# Patient Record
Sex: FEMALE | Race: WHITE | NOT HISPANIC OR LATINO | Employment: OTHER | URBAN - METROPOLITAN AREA
[De-identification: names, ages, dates, MRNs, and addresses within clinical notes are randomized per-mention and may not be internally consistent; named-entity substitution may affect disease eponyms.]

---

## 2018-02-14 ENCOUNTER — RECORDS - HEALTHEAST (OUTPATIENT)
Dept: LAB | Facility: CLINIC | Age: 58
End: 2018-02-14

## 2018-02-14 LAB
ALBUMIN SERPL-MCNC: 4.1 G/DL (ref 3.5–5)
ALP SERPL-CCNC: 126 U/L (ref 45–120)
ALT SERPL W P-5'-P-CCNC: 134 U/L (ref 0–45)
ANION GAP SERPL CALCULATED.3IONS-SCNC: 16 MMOL/L (ref 5–18)
AST SERPL W P-5'-P-CCNC: 81 U/L (ref 0–40)
BILIRUB SERPL-MCNC: 1.6 MG/DL (ref 0–1)
BUN SERPL-MCNC: 9 MG/DL (ref 8–22)
CALCIUM SERPL-MCNC: 9.4 MG/DL (ref 8.5–10.5)
CHLORIDE BLD-SCNC: 104 MMOL/L (ref 98–107)
CO2 SERPL-SCNC: 21 MMOL/L (ref 22–31)
CREAT SERPL-MCNC: 0.78 MG/DL (ref 0.6–1.1)
GFR SERPL CREATININE-BSD FRML MDRD: >60 ML/MIN/1.73M2
GLUCOSE BLD-MCNC: 102 MG/DL (ref 70–125)
POTASSIUM BLD-SCNC: 3.8 MMOL/L (ref 3.5–5)
PROT SERPL-MCNC: 8 G/DL (ref 6–8)
SODIUM SERPL-SCNC: 141 MMOL/L (ref 136–145)

## 2018-02-19 ENCOUNTER — RECORDS - HEALTHEAST (OUTPATIENT)
Dept: LAB | Facility: CLINIC | Age: 58
End: 2018-02-19

## 2018-02-21 LAB
HAV IGM SERPL QL IA: NEGATIVE
HBV CORE IGM SERPL QL IA: NEGATIVE
HBV SURFACE AG SERPL QL IA: NEGATIVE
HCV AB SERPL QL IA: NEGATIVE

## 2018-04-09 ENCOUNTER — RECORDS - HEALTHEAST (OUTPATIENT)
Dept: LAB | Facility: CLINIC | Age: 58
End: 2018-04-09

## 2018-04-10 LAB
ALBUMIN SERPL-MCNC: 3.9 G/DL (ref 3.5–5)
ALP SERPL-CCNC: 126 U/L (ref 45–120)
ALT SERPL W P-5'-P-CCNC: 28 U/L (ref 0–45)
AST SERPL W P-5'-P-CCNC: 21 U/L (ref 0–40)
BILIRUB DIRECT SERPL-MCNC: 0.2 MG/DL
BILIRUB SERPL-MCNC: 0.8 MG/DL (ref 0–1)
CHOLEST SERPL-MCNC: 234 MG/DL
FASTING STATUS PATIENT QL REPORTED: ABNORMAL
HDLC SERPL-MCNC: 65 MG/DL
LDLC SERPL CALC-MCNC: 140 MG/DL
PROT SERPL-MCNC: 7.7 G/DL (ref 6–8)
TRIGL SERPL-MCNC: 144 MG/DL

## 2018-12-18 ENCOUNTER — RECORDS - HEALTHEAST (OUTPATIENT)
Dept: LAB | Facility: CLINIC | Age: 58
End: 2018-12-18

## 2018-12-18 LAB
ALBUMIN SERPL-MCNC: 4.3 G/DL (ref 3.5–5)
ALP SERPL-CCNC: 119 U/L (ref 45–120)
ALT SERPL W P-5'-P-CCNC: 30 U/L (ref 0–45)
ANION GAP SERPL CALCULATED.3IONS-SCNC: 15 MMOL/L (ref 5–18)
AST SERPL W P-5'-P-CCNC: 24 U/L (ref 0–40)
BILIRUB SERPL-MCNC: 1.1 MG/DL (ref 0–1)
BUN SERPL-MCNC: 11 MG/DL (ref 8–22)
CALCIUM SERPL-MCNC: 10.2 MG/DL (ref 8.5–10.5)
CHLORIDE BLD-SCNC: 103 MMOL/L (ref 98–107)
CO2 SERPL-SCNC: 22 MMOL/L (ref 22–31)
CREAT SERPL-MCNC: 0.8 MG/DL (ref 0.6–1.1)
GFR SERPL CREATININE-BSD FRML MDRD: >60 ML/MIN/1.73M2
GLUCOSE BLD-MCNC: 101 MG/DL (ref 70–125)
POTASSIUM BLD-SCNC: 4.2 MMOL/L (ref 3.5–5)
PROT SERPL-MCNC: 7.7 G/DL (ref 6–8)
SODIUM SERPL-SCNC: 140 MMOL/L (ref 136–145)
TSH SERPL DL<=0.005 MIU/L-ACNC: 1.24 UIU/ML (ref 0.3–5)
VIT B12 SERPL-MCNC: 634 PG/ML (ref 213–816)

## 2018-12-19 LAB — 25(OH)D3 SERPL-MCNC: 20.5 NG/ML (ref 30–80)

## 2019-01-11 ENCOUNTER — RECORDS - HEALTHEAST (OUTPATIENT)
Dept: ADMINISTRATIVE | Facility: OTHER | Age: 59
End: 2019-01-11

## 2019-01-11 ENCOUNTER — AMBULATORY - HEALTHEAST (OUTPATIENT)
Dept: CARDIOLOGY | Facility: CLINIC | Age: 59
End: 2019-01-11

## 2019-01-18 ENCOUNTER — OFFICE VISIT - HEALTHEAST (OUTPATIENT)
Dept: CARDIOLOGY | Facility: CLINIC | Age: 59
End: 2019-01-18

## 2019-01-18 DIAGNOSIS — R06.09 DOE (DYSPNEA ON EXERTION): ICD-10-CM

## 2019-01-18 DIAGNOSIS — R00.0 SINUS TACHYCARDIA: ICD-10-CM

## 2019-01-18 RX ORDER — IBUPROFEN 200 MG
200-400 TABLET ORAL EVERY 6 HOURS PRN
Status: SHIPPED | COMMUNITY
Start: 2019-01-18

## 2019-01-18 RX ORDER — ACETAMINOPHEN 500 MG
500-1000 TABLET ORAL EVERY 6 HOURS PRN
Status: SHIPPED | COMMUNITY
Start: 2019-01-18

## 2019-01-18 RX ORDER — OMEGA-3S/DHA/EPA/FISH OIL/D3 300MG-1000
400 CAPSULE ORAL DAILY
Status: SHIPPED | COMMUNITY
Start: 2019-01-18

## 2019-01-18 RX ORDER — PAROXETINE HYDROCHLORIDE HEMIHYDRATE 25 MG/1
1 TABLET, FILM COATED, EXTENDED RELEASE ORAL DAILY
Refills: 2 | Status: SHIPPED | COMMUNITY
Start: 2018-09-04

## 2019-01-18 RX ORDER — ZOLPIDEM TARTRATE 10 MG/1
0.5 TABLET ORAL
Refills: 2 | Status: SHIPPED | COMMUNITY
Start: 2018-10-11

## 2019-01-18 RX ORDER — OMEPRAZOLE 40 MG/1
1 CAPSULE, DELAYED RELEASE ORAL PRN
Refills: 3 | Status: SHIPPED | COMMUNITY
Start: 2018-05-03

## 2019-01-18 ASSESSMENT — MIFFLIN-ST. JEOR: SCORE: 1675.85

## 2019-01-31 ENCOUNTER — HOSPITAL ENCOUNTER (OUTPATIENT)
Dept: CARDIOLOGY | Facility: CLINIC | Age: 59
Discharge: HOME | End: 2019-01-31
Attending: INTERNAL MEDICINE

## 2019-01-31 DIAGNOSIS — R00.0 SINUS TACHYCARDIA: ICD-10-CM

## 2019-01-31 DIAGNOSIS — R06.09 DOE (DYSPNEA ON EXERTION): ICD-10-CM

## 2019-01-31 DIAGNOSIS — R06.09 OTHER FORMS OF DYSPNEA: ICD-10-CM

## 2019-01-31 LAB
CV STRESS CURRENT BP HE: NORMAL
CV STRESS CURRENT HR HE: 118
CV STRESS CURRENT HR HE: 119
CV STRESS CURRENT HR HE: 119
CV STRESS CURRENT HR HE: 125
CV STRESS CURRENT HR HE: 127
CV STRESS CURRENT HR HE: 131
CV STRESS CURRENT HR HE: 134
CV STRESS CURRENT HR HE: 138
CV STRESS CURRENT HR HE: 140
CV STRESS CURRENT HR HE: 141
CV STRESS CURRENT HR HE: 147
CV STRESS CURRENT HR HE: 149
CV STRESS CURRENT HR HE: 151
CV STRESS CURRENT HR HE: 154
CV STRESS CURRENT HR HE: 160
CV STRESS CURRENT HR HE: 164
CV STRESS CURRENT HR HE: 165
CV STRESS CURRENT HR HE: 165
CV STRESS CURRENT HR HE: 179
CV STRESS CURRENT HR HE: 185
CV STRESS DEVIATION TIME HE: NORMAL
CV STRESS ECHO PERCENT HR HE: NORMAL
CV STRESS EXERCISE STAGE HE: NORMAL
CV STRESS FINAL RESTING BP HE: NORMAL
CV STRESS FINAL RESTING HR HE: 119
CV STRESS MAX HR HE: 186
CV STRESS MAX TREADMILL GRADE HE: 14
CV STRESS MAX TREADMILL SPEED HE: 3.4
CV STRESS PEAK DIA BP HE: NORMAL
CV STRESS PEAK SYS BP HE: NORMAL
CV STRESS PHASE HE: NORMAL
CV STRESS PROTOCOL HE: NORMAL
CV STRESS RESTING PT POSITION HE: NORMAL
CV STRESS RESTING PT POSITION HE: NORMAL
CV STRESS ST DEVIATION AMOUNT HE: NORMAL
CV STRESS ST DEVIATION ELEVATION HE: NORMAL
CV STRESS ST EVELATION AMOUNT HE: NORMAL
CV STRESS TEST TYPE HE: NORMAL
CV STRESS TOTAL STAGE TIME MIN 1 HE: NORMAL
STRESS ECHO BASELINE BP: NORMAL
STRESS ECHO BASELINE HR: 126
STRESS ECHO CALCULATED PERCENT HR: 114 %
STRESS ECHO LAST STRESS BP: NORMAL
STRESS ECHO LAST STRESS HR: 185
STRESS ECHO POST ESTIMATED WORKLOAD: 7.3
STRESS ECHO POST EXERCISE DUR MIN: 6
STRESS ECHO POST EXERCISE DUR SEC: 0
STRESS ECHO TARGET HR: 139

## 2019-09-24 ENCOUNTER — RECORDS - HEALTHEAST (OUTPATIENT)
Dept: LAB | Facility: CLINIC | Age: 59
End: 2019-09-24

## 2019-09-24 LAB
CHOLEST SERPL-MCNC: 253 MG/DL
FASTING STATUS PATIENT QL REPORTED: YES
HDLC SERPL-MCNC: 62 MG/DL
LDLC SERPL CALC-MCNC: 147 MG/DL
TRIGL SERPL-MCNC: 219 MG/DL

## 2019-09-25 LAB — MEV IGG SER IA-ACNC: POSITIVE

## 2019-12-11 ENCOUNTER — RECORDS - HEALTHEAST (OUTPATIENT)
Dept: LAB | Facility: CLINIC | Age: 59
End: 2019-12-11

## 2019-12-12 LAB — BACTERIA SPEC CULT: NO GROWTH

## 2020-01-14 ENCOUNTER — RECORDS - HEALTHEAST (OUTPATIENT)
Dept: LAB | Facility: CLINIC | Age: 60
End: 2020-01-14

## 2020-01-14 LAB
ALBUMIN SERPL-MCNC: 4.5 G/DL (ref 3.5–5)
ALP SERPL-CCNC: 123 U/L (ref 45–120)
ALT SERPL W P-5'-P-CCNC: 61 U/L (ref 0–45)
AMYLASE SERPL-CCNC: 34 U/L (ref 5–120)
ANION GAP SERPL CALCULATED.3IONS-SCNC: 15 MMOL/L (ref 5–18)
AST SERPL W P-5'-P-CCNC: 56 U/L (ref 0–40)
BILIRUB SERPL-MCNC: 1.8 MG/DL (ref 0–1)
BUN SERPL-MCNC: 14 MG/DL (ref 8–22)
CALCIUM SERPL-MCNC: 9.9 MG/DL (ref 8.5–10.5)
CHLORIDE BLD-SCNC: 102 MMOL/L (ref 98–107)
CO2 SERPL-SCNC: 21 MMOL/L (ref 22–31)
CREAT SERPL-MCNC: 0.94 MG/DL (ref 0.6–1.1)
GFR SERPL CREATININE-BSD FRML MDRD: >60 ML/MIN/1.73M2
GLUCOSE BLD-MCNC: 98 MG/DL (ref 70–125)
LIPASE SERPL-CCNC: 29 U/L (ref 0–52)
POTASSIUM BLD-SCNC: 3.9 MMOL/L (ref 3.5–5)
PROT SERPL-MCNC: 7.9 G/DL (ref 6–8)
SODIUM SERPL-SCNC: 138 MMOL/L (ref 136–145)

## 2020-01-15 ENCOUNTER — RECORDS - HEALTHEAST (OUTPATIENT)
Dept: LAB | Facility: CLINIC | Age: 60
End: 2020-01-15

## 2020-01-17 LAB — H PYLORI AG STL QL IA: NEGATIVE

## 2020-01-20 ENCOUNTER — RECORDS - HEALTHEAST (OUTPATIENT)
Dept: ADMINISTRATIVE | Facility: OTHER | Age: 60
End: 2020-01-20

## 2020-01-21 ENCOUNTER — HOSPITAL ENCOUNTER (OUTPATIENT)
Dept: ULTRASOUND IMAGING | Facility: CLINIC | Age: 60
Discharge: HOME | End: 2020-01-21
Attending: PHYSICIAN ASSISTANT

## 2020-01-21 DIAGNOSIS — R10.9 ABDOMINAL PAIN: ICD-10-CM

## 2020-02-06 ENCOUNTER — RECORDS - HEALTHEAST (OUTPATIENT)
Dept: ADMINISTRATIVE | Facility: OTHER | Age: 60
End: 2020-02-06

## 2020-02-06 LAB
LAB AP CHARGES (HE HISTORICAL CONVERSION): NORMAL
PATH REPORT.COMMENTS IMP SPEC: NORMAL
PATH REPORT.FINAL DX SPEC: NORMAL
PATH REPORT.GROSS SPEC: NORMAL
PATH REPORT.MICROSCOPIC SPEC OTHER STN: NORMAL
PATH REPORT.RELEVANT HX SPEC: NORMAL
RESULT FLAG (HE HISTORICAL CONVERSION): NORMAL

## 2020-08-05 ENCOUNTER — RECORDS - HEALTHEAST (OUTPATIENT)
Dept: LAB | Facility: CLINIC | Age: 60
End: 2020-08-05

## 2020-08-08 LAB
BACTERIA SPEC CULT: ABNORMAL
BACTERIA SPEC CULT: ABNORMAL

## 2020-08-17 ENCOUNTER — RECORDS - HEALTHEAST (OUTPATIENT)
Dept: LAB | Facility: CLINIC | Age: 60
End: 2020-08-17

## 2020-08-18 LAB — BACTERIA SPEC CULT: NO GROWTH

## 2020-08-20 ENCOUNTER — RECORDS - HEALTHEAST (OUTPATIENT)
Dept: LAB | Facility: CLINIC | Age: 60
End: 2020-08-20

## 2020-08-20 LAB
ALBUMIN SERPL-MCNC: 4.2 G/DL (ref 3.5–5)
ALP SERPL-CCNC: 124 U/L (ref 45–120)
ALT SERPL W P-5'-P-CCNC: 18 U/L (ref 0–45)
ANION GAP SERPL CALCULATED.3IONS-SCNC: 15 MMOL/L (ref 5–18)
AST SERPL W P-5'-P-CCNC: 17 U/L (ref 0–40)
BILIRUB SERPL-MCNC: 1.1 MG/DL (ref 0–1)
BUN SERPL-MCNC: 12 MG/DL (ref 8–22)
CALCIUM SERPL-MCNC: 9.3 MG/DL (ref 8.5–10.5)
CHLORIDE BLD-SCNC: 104 MMOL/L (ref 98–107)
CO2 SERPL-SCNC: 21 MMOL/L (ref 22–31)
CREAT SERPL-MCNC: 0.85 MG/DL (ref 0.6–1.1)
GFR SERPL CREATININE-BSD FRML MDRD: >60 ML/MIN/1.73M2
GLUCOSE BLD-MCNC: 95 MG/DL (ref 70–125)
LIPASE SERPL-CCNC: 31 U/L (ref 0–52)
POTASSIUM BLD-SCNC: 3.8 MMOL/L (ref 3.5–5)
PROT SERPL-MCNC: 7.5 G/DL (ref 6–8)
SODIUM SERPL-SCNC: 140 MMOL/L (ref 136–145)

## 2020-10-29 ENCOUNTER — RECORDS - HEALTHEAST (OUTPATIENT)
Dept: LAB | Facility: CLINIC | Age: 60
End: 2020-10-29

## 2020-10-31 LAB — BACTERIA SPEC CULT: ABNORMAL

## 2021-03-25 ENCOUNTER — RECORDS - HEALTHEAST (OUTPATIENT)
Dept: LAB | Facility: CLINIC | Age: 61
End: 2021-03-25

## 2021-03-27 LAB — BACTERIA SPEC CULT: ABNORMAL

## 2021-05-25 ENCOUNTER — RECORDS - HEALTHEAST (OUTPATIENT)
Dept: ADMINISTRATIVE | Facility: CLINIC | Age: 61
End: 2021-05-25

## 2021-06-02 VITALS — HEIGHT: 66 IN | BODY MASS INDEX: 38.41 KG/M2 | WEIGHT: 239 LBS

## 2021-06-23 NOTE — PROGRESS NOTES
[facility] Consultation Note    Thank you, Marian Ramos PA-C, for asking the [facility] team to see Jaja Hull in consultation today at our clinic to evaluate dyspnea on exertion and sinus tachycardia.      Assessment:   1.  Dyspnea on exertion likely due to deconditioning and obesity doubt any significant underlying cardiac pathology  2.  Sinus tachycardia is a variation of normal     Plan:   We will obtain a stress echo in the near future to evaluate underlying left ventricular systolic function and to exclude significant underlying coronary artery disease.  We will have a Holter monitor placed to document normal variation in heart rate throughout the day.  If these 2 tests are unremarkable no further cardiac evaluation or follow-up is needed            Current History:   58-year-old female with no previous cardiac history.  Patient states she had a stress test in  that was normal.  She otherwise has had no other cardiac evaluation.  Patient had an EKG obtained in  when she had a sinus tachycardia on that tracing at 110 bpm.  Patient had a recent physical evaluation on 2018 where an EKG showed a sinus tachycardia at 108 bpm.  Patient does report to some mild dyspnea on exertion with exercise but no other symptoms of heart failure.  Patient has no prior regular exercise until the beginning of this year when she is attempted to begin a walking program    Past Medical History:   No past medical history on file.  Gastroesophageal reflux disease as her only medical problem as well as a history of anxiety    Past Surgical History:   No past surgical history on file.  Left bunionectomy as her only surgery    Family History:   No family history on file.  Father lived to be 92.  Mother  of a brain aneurysm at age 53.  She has 2 brothers none of whom have known coronary artery disease    Social History:    reports that  has never smoked. she has never used  "smokeless tobacco. She reports that she does not use drugs.  Patient is single and employed     Meds:   Scheduled Meds:  PRN Meds:.    Allergies:   Sulfa (sulfonamide antibiotics)    Review of Systems:   General: WNL  Eyes: WNL  Ears/Nose/Throat: WNL  Lungs: WNL  Heart: WNL  Stomach: WNL  Bladder: WNL  Muscle/Joints: WNL  Skin: WNL  Nervous System: WNL  Mental Health: Depression, Anxiety     Blood: WNL      Objective:      Physical Exam  (!) 239 lb (108.4 kg)  5' 6\" (1.676 m)  Body mass index is 38.58 kg/m .  /78 (Patient Site: Right Arm, Patient Position: Sitting, Cuff Size: Adult Large)   Pulse (!) 116   Resp 16   Ht 5' 6\" (1.676 m)   Wt (!) 239 lb (108.4 kg)   BMI 38.58 kg/m      General Appearance:   alert, no apparent distress   HEENT:  no scleral icterus; the mucous membranes were pink and moist                                  Neck: jugular venous pressure is normal, no thyromegaly   Chest: the spine was straight and the chest was symmetric   Lungs:   respirations unlabored; the lungs are clear to auscultation   Cardiovascular:   regular rhythm with normal first and second heart sounds and no murmurs, clicks, or gallops. Carotid, radial, femoral, and posterior tibial pulses are intact; there are no carotid or femoral bruits.   Abdomen:  no organomegaly, masses, bruits, or tenderness; bowel sounds are present   Extremities: no cyanosis, clubbing, or edema.  No obvious musculoskeletal abnormalities   Skin: no xanthelasma   Neurologic: mood and affect are appropriate         ECG from December 18, 2018 personally reviewed as above          Lab Review   Lab Results   Component Value Date     12/18/2018    K 4.2 12/18/2018     12/18/2018    CO2 22 12/18/2018    BUN 11 12/18/2018    CREATININE 0.80 12/18/2018    CALCIUM 10.2 12/18/2018     No results found for: WBC, HGB, HCT, MCV, PLT  Lab Results   Component Value Date    CHOL 234 (H) 04/09/2018    TRIG 144 04/09/2018    HDL 65 04/09/2018 "         Russell Johnson M.D., F.A.C.[facility]  [phone]

## 2021-07-13 ENCOUNTER — RECORDS - HEALTHEAST (OUTPATIENT)
Dept: ADMINISTRATIVE | Facility: CLINIC | Age: 61
End: 2021-07-13

## 2022-07-15 ENCOUNTER — LAB REQUISITION (OUTPATIENT)
Dept: LAB | Facility: CLINIC | Age: 62
End: 2022-07-15

## 2022-07-15 DIAGNOSIS — Z13.220 ENCOUNTER FOR SCREENING FOR LIPOID DISORDERS: ICD-10-CM

## 2022-07-15 DIAGNOSIS — Z13.1 ENCOUNTER FOR SCREENING FOR DIABETES MELLITUS: ICD-10-CM

## 2022-07-15 LAB
ALBUMIN SERPL BCG-MCNC: 4.6 G/DL (ref 3.5–5.2)
ALP SERPL-CCNC: 119 U/L (ref 35–104)
ALT SERPL W P-5'-P-CCNC: 24 U/L (ref 10–35)
ANION GAP SERPL CALCULATED.3IONS-SCNC: 15 MMOL/L (ref 7–15)
AST SERPL W P-5'-P-CCNC: 25 U/L (ref 10–35)
BILIRUB SERPL-MCNC: 0.7 MG/DL
BUN SERPL-MCNC: 14.4 MG/DL (ref 8–23)
CALCIUM SERPL-MCNC: 9.9 MG/DL (ref 8.8–10.2)
CHLORIDE SERPL-SCNC: 100 MMOL/L (ref 98–107)
CHOLEST SERPL-MCNC: 335 MG/DL
CREAT SERPL-MCNC: 0.83 MG/DL (ref 0.51–0.95)
DEPRECATED HCO3 PLAS-SCNC: 23 MMOL/L (ref 22–29)
GFR SERPL CREATININE-BSD FRML MDRD: 80 ML/MIN/1.73M2
GLUCOSE SERPL-MCNC: 97 MG/DL (ref 70–99)
HDLC SERPL-MCNC: 69 MG/DL
LDLC SERPL CALC-MCNC: 221 MG/DL
NONHDLC SERPL-MCNC: 266 MG/DL
POTASSIUM SERPL-SCNC: 4 MMOL/L (ref 3.4–5.3)
PROT SERPL-MCNC: 7.6 G/DL (ref 6.4–8.3)
SODIUM SERPL-SCNC: 138 MMOL/L (ref 136–145)
TRIGL SERPL-MCNC: 223 MG/DL

## 2022-07-15 PROCEDURE — 80053 COMPREHEN METABOLIC PANEL: CPT | Performed by: NURSE PRACTITIONER

## 2022-07-15 PROCEDURE — 80061 LIPID PANEL: CPT | Performed by: NURSE PRACTITIONER

## 2023-04-17 ENCOUNTER — LAB REQUISITION (OUTPATIENT)
Dept: LAB | Facility: CLINIC | Age: 63
End: 2023-04-17

## 2023-04-17 DIAGNOSIS — E78.2 MIXED HYPERLIPIDEMIA: ICD-10-CM

## 2023-04-17 LAB
ALBUMIN SERPL BCG-MCNC: 4.6 G/DL (ref 3.5–5.2)
ALP SERPL-CCNC: 124 U/L (ref 35–104)
ALT SERPL W P-5'-P-CCNC: 24 U/L (ref 10–35)
ANION GAP SERPL CALCULATED.3IONS-SCNC: 17 MMOL/L (ref 7–15)
AST SERPL W P-5'-P-CCNC: 21 U/L (ref 10–35)
BILIRUB SERPL-MCNC: 0.8 MG/DL
BUN SERPL-MCNC: 15 MG/DL (ref 8–23)
CALCIUM SERPL-MCNC: 9.7 MG/DL (ref 8.8–10.2)
CHLORIDE SERPL-SCNC: 102 MMOL/L (ref 98–107)
CHOLEST SERPL-MCNC: 248 MG/DL
CREAT SERPL-MCNC: 0.98 MG/DL (ref 0.51–0.95)
DEPRECATED HCO3 PLAS-SCNC: 22 MMOL/L (ref 22–29)
GFR SERPL CREATININE-BSD FRML MDRD: 65 ML/MIN/1.73M2
GLUCOSE SERPL-MCNC: 105 MG/DL (ref 70–99)
HDLC SERPL-MCNC: 66 MG/DL
LDLC SERPL CALC-MCNC: 151 MG/DL
NONHDLC SERPL-MCNC: 182 MG/DL
POTASSIUM SERPL-SCNC: 4.1 MMOL/L (ref 3.4–5.3)
PROT SERPL-MCNC: 7.3 G/DL (ref 6.4–8.3)
SODIUM SERPL-SCNC: 141 MMOL/L (ref 136–145)
TRIGL SERPL-MCNC: 153 MG/DL

## 2023-04-17 PROCEDURE — 80061 LIPID PANEL: CPT | Performed by: FAMILY MEDICINE

## 2023-04-17 PROCEDURE — 80053 COMPREHEN METABOLIC PANEL: CPT | Performed by: FAMILY MEDICINE

## 2024-07-07 ENCOUNTER — LAB REQUISITION (OUTPATIENT)
Dept: LAB | Facility: CLINIC | Age: 64
End: 2024-07-07

## 2024-07-07 DIAGNOSIS — R30.0 DYSURIA: ICD-10-CM

## 2024-07-07 PROCEDURE — 87186 SC STD MICRODIL/AGAR DIL: CPT | Performed by: FAMILY MEDICINE

## 2024-07-07 PROCEDURE — 87086 URINE CULTURE/COLONY COUNT: CPT | Performed by: FAMILY MEDICINE

## 2024-07-09 LAB — BACTERIA UR CULT: ABNORMAL

## 2024-10-28 ENCOUNTER — HOSPITAL ENCOUNTER (EMERGENCY)
Facility: HOSPITAL | Age: 64
Discharge: HOME | End: 2024-10-29
Attending: EMERGENCY MEDICINE | Admitting: EMERGENCY MEDICINE
Payer: COMMERCIAL

## 2024-10-28 DIAGNOSIS — M54.50 ACUTE BILATERAL LOW BACK PAIN WITHOUT SCIATICA: ICD-10-CM

## 2024-10-28 PROCEDURE — 99284 EMERGENCY DEPT VISIT MOD MDM: CPT

## 2024-10-28 ASSESSMENT — COLUMBIA-SUICIDE SEVERITY RATING SCALE - C-SSRS
1. IN THE PAST MONTH, HAVE YOU WISHED YOU WERE DEAD OR WISHED YOU COULD GO TO SLEEP AND NOT WAKE UP?: NO
6. HAVE YOU EVER DONE ANYTHING, STARTED TO DO ANYTHING, OR PREPARED TO DO ANYTHING TO END YOUR LIFE?: NO
2. HAVE YOU ACTUALLY HAD ANY THOUGHTS OF KILLING YOURSELF IN THE PAST MONTH?: NO

## 2024-10-29 ENCOUNTER — APPOINTMENT (OUTPATIENT)
Dept: RADIOLOGY | Facility: HOSPITAL | Age: 64
End: 2024-10-29
Attending: EMERGENCY MEDICINE
Payer: COMMERCIAL

## 2024-10-29 VITALS
BODY MASS INDEX: 39.54 KG/M2 | TEMPERATURE: 96.8 F | WEIGHT: 245 LBS | OXYGEN SATURATION: 93 % | RESPIRATION RATE: 16 BRPM | SYSTOLIC BLOOD PRESSURE: 156 MMHG | DIASTOLIC BLOOD PRESSURE: 81 MMHG | HEART RATE: 94 BPM

## 2024-10-29 PROCEDURE — 96372 THER/PROPH/DIAG INJ SC/IM: CPT | Performed by: EMERGENCY MEDICINE

## 2024-10-29 PROCEDURE — 250N000013 HC RX MED GY IP 250 OP 250 PS 637: Performed by: EMERGENCY MEDICINE

## 2024-10-29 PROCEDURE — 72100 X-RAY EXAM L-S SPINE 2/3 VWS: CPT

## 2024-10-29 PROCEDURE — 250N000011 HC RX IP 250 OP 636: Performed by: EMERGENCY MEDICINE

## 2024-10-29 RX ORDER — METHYLPREDNISOLONE 4 MG/1
TABLET ORAL
Qty: 21 TABLET | Refills: 0 | Status: SHIPPED | OUTPATIENT
Start: 2024-10-29

## 2024-10-29 RX ORDER — METHOCARBAMOL 500 MG/1
500 TABLET, FILM COATED ORAL ONCE
Status: COMPLETED | OUTPATIENT
Start: 2024-10-29 | End: 2024-10-29

## 2024-10-29 RX ORDER — KETOROLAC TROMETHAMINE 15 MG/ML
15 INJECTION, SOLUTION INTRAMUSCULAR; INTRAVENOUS ONCE
Status: COMPLETED | OUTPATIENT
Start: 2024-10-29 | End: 2024-10-29

## 2024-10-29 RX ADMIN — KETOROLAC TROMETHAMINE 15 MG: 15 INJECTION, SOLUTION INTRAMUSCULAR; INTRAVENOUS at 00:43

## 2024-10-29 RX ADMIN — METHOCARBAMOL 500 MG: 500 TABLET ORAL at 00:43

## 2024-10-29 ASSESSMENT — ACTIVITIES OF DAILY LIVING (ADL)
ADLS_ACUITY_SCORE: 0
ADLS_ACUITY_SCORE: 0

## 2024-10-29 NOTE — ED PROVIDER NOTES
EMERGENCY DEPARTMENT ENCOUNTER      NAME: Jaja Hull  AGE: 64 year old female  YOB: 1960  MRN: [id number]  EVALUATION DATE & TIME: No admission date for patient encounter.    PCP: Marian Contreras    ED PROVIDER: Eva Condon MD      Chief Complaint   Patient presents with    Fall         FINAL IMPRESSION:  1. Acute bilateral low back pain without sciatica          ED COURSE & MEDICAL DECISION MAKING:    Pertinent Labs & Imaging studies reviewed. (See chart for details)    11:39 PM I introduced myself to the patient, obtained patient history, performed a physical exam, and discussed plan for ED workup including potential diagnostic laboratory/imaging studies and interventions.    64 year old female with reported history of chronic back issues who presents to this ED for evaluation of lower back pain after a fall.  Patient reports she was whispering this evening in her kitchen when she slipped and fell onto her buttocks now complaining of low back pain.  States she did not slightly twisted her left knee with this and had some mild pain in the knee but has been able to ambulate on the left leg and states her main concern is lower back pain.  On exam she does not have any tenderness over the left knee and has full range of motion and with her able to ambulate on this area and her physical exam findings felt that knee fracture dislocation would be very unlikely and she was in agreement with avoiding x-ray of the knee at this time.  Her main complaint is pain across her lower back.  Does not have any midline thoracic or lumbar spine tenderness.  Did not strike her head or lose consciousness.  No cervical neck pain or tenderness.  She does have current ongoing issues with both her thoracic and lumbar spine and shows me prior MRIs done a couple of years ago showing degenerative disc disease and disc herniation.  She states she recently finished a steroid burst about a week ago and has an upcoming  appointment with physical therapy for issues in her thoracic spine.  On exam here she has no signs of spinal cord compression.  She is neurologically and neurovascularly intact.  She is able to ambulate.  She was given a dose of 15 mg of IM Toradol as well as 500 mg of oral Robaxin.  She does have a Robaxin prescription at home.  Had not yet taken anything for her pain prior to arrival.  Discussed obtaining x-ray of the lumbar spine to rule out vertebral fracture however overall felt this was less likely.  She does agree with this plan.  She has no other sign of injury.    She has no red flag symptoms.  X-ray does not show any compression fractures.  She does have mild anterior spondylolisthesis at L4-L5 and L5-S1 and mild posterior spondylolisthesis at L2-L3 L3-L4.  Normal vertebral body heights.  Mild to moderate disc space narrowing throughout the lower thoracic and upper lumbar spine with bulky ventral osteophyte most notably at L1-L2.  Severe facet arthropathy at L3-S1.  The visualized sacrum and pelvis are unremarkable.  Likely that she has exacerbated her chronic issues with the fall.  She was feeling improved on reevaluation and was able to ambulate to the bathroom here.  Remains without any neurologic deficit or signs or symptoms of spinal cord compression.  She would like to try a Medrol Dosepak as she did feel that the prior steroid burst was helpful.  She will use the Flexeril she already has a prescription for at home and follow-up with physical therapy later this week as she is already scheduled.  I recommended she discuss her lower back issues with them as well.  She voiced understanding.  She was given indications for return emergency department.  Advised her to follow-up with her primary care doctor.  She voiced understanding.  She was discharged home in stable condition.             At the conclusion of the encounter I discussed the results of all of the tests and the disposition. The questions  were answered. The patient or family acknowledged understanding and was agreeable with the care plan.         Medical Decision Making  Obtained supplemental history:Supplemental history obtained?: Documented in chart  Reviewed external records: External records reviewed?: Documented in chart  Care impacted by chronic illness: NA  Care significantly affected by social determinants of health:Access to Medical Care  Did you consider but not order tests?: Work up considered but not performed and documented in chart, if applicable  Did you interpret images independently?: Independent interpretation of ECG and images noted in documentation, when applicable.  Consultation discussion with other provider:Did you involve another provider (consultant, , pharmacy, etc.)?: No  Discharge. I prescribed additional prescription strength medication(s) as charted. See documentation for any additional details.    Not Applicable      MEDICATIONS GIVEN IN THE EMERGENCY:  Medications   ketorolac (TORADOL) injection 15 mg (15 mg Intramuscular $Given 10/29/24 0043)   methocarbamol (ROBAXIN) tablet 500 mg (500 mg Oral $Given 10/29/24 0043)       NEW PRESCRIPTIONS STARTED AT TODAY'S ER VISIT  Discharge Medication List as of 10/29/2024  2:06 AM        START taking these medications    Details   methylPREDNISolone (MEDROL DOSEPAK) 4 MG tablet therapy pack Follow Package Directions, Disp-21 tablet, R-0, E-Prescribe                =================================================================    HPI    Patient information was obtained from: Patient    Use of : N/A         Jaja Hull is a 64 year old female with reported history of chronic back issues who presents to this ED for evaluation of lower back pain after a fall.  Patient reports that she was referring her kitchen floor this evening when she slipped and fell onto her buttocks.  She states she twisted her left knee somewhat when she fell and was having some mild left  knee pain but that she has been able to walk on her left leg and does not have significant pain and is mostly concerned about pain in her lower back after this incident.  She states it is across her lower back on both sides.  Denies any radiation into her legs.  Denies any numbness, tingling, weakness, saddle anesthesia, or bowel or bladder incontinence.  States she did not strike her head or lose consciousness.  Denies any other injury or other areas of pain.  No chest pain or shortness of breath.  No abdominal pain.  No nausea vomiting or diarrhea.  No other complaints.  States she has had ongoing issues with both her thoracic and lumbar spine and has had previous MRIs and worked with physical therapy in the past.  States she was actually just recently on a steroid burst for some thoracic back pain and was recently referred for physical therapy which is starting later this week.  Denies any prior surgery on her back.  No history of diabetes.      REVIEW OF SYSTEMS   Pertinent positives and negatives are documented in the HPI. All other systems reviewed and are negative.      PAST MEDICAL HISTORY:  No past medical history on file.    PAST SURGICAL HISTORY:  No past surgical history on file.        CURRENT MEDICATIONS:    methylPREDNISolone (MEDROL DOSEPAK) 4 MG tablet therapy pack  acetaminophen (TYLENOL) 500 MG tablet  cholecalciferol, vitamin D3, 400 unit Tab  ibuprofen (ADVIL,MOTRIN) 200 MG tablet  omeprazole (PRILOSEC) 40 MG capsule  PARoxetine (PAXIL-CR) 25 MG 24 hr tablet  zolpidem (AMBIEN) 10 mg tablet        ALLERGIES:  Allergies   Allergen Reactions    Sulfa (Sulfonamide Antibiotics) [Sulfa Antibiotics] Itching       FAMILY HISTORY:  No family history on file.    SOCIAL HISTORY:   Social History     Socioeconomic History    Marital status: Single   Tobacco Use    Smoking status: Never    Smokeless tobacco: Never   Substance and Sexual Activity    Drug use: No       VITALS:  BP (!) 156/81   Pulse 94    Temp 96.8  F (36  C) (Temporal)   Resp 16   Wt 111.1 kg (245 lb)   SpO2 93%   BMI 39.54 kg/m      PHYSICAL EXAM    Physical Exam  Constitutional: Well developed, Well nourished, NAD, GCS 15  HENT: Normocephalic, Atraumatic, Bilateral external ears normal, Oropharynx normal, mucous membranes moist, Nose normal. Neck-  Normal range of motion, No tenderness, Supple, No stridor.    Eyes: PERRL, EOMI, Conjunctiva normal, No discharge.   Respiratory: Normal breath sounds, No respiratory distress, No wheezing or crackles, Speaks in full sentences easily.    Cardiovascular: Normal heart rate, Regular rhythm, No murmurs, No rubs, No gallops. 2+ radial pulses bilaterally. No reproducible chest tenderness.    GI: Bowel sounds normal, Soft, No tenderness, No masses, No rebound or guarding. No CVA tenderness bilaterally   Musculoskeletal: 2+ DP and PT pulses bilaterally. No notable lower extremity edema.  No cyanosis, No clubbing. Good range of motion in all major joints. No tenderness to palpation or major deformities noted. Specifically no bony tenderness of the left knee with normal range of motion and no swelling. No bony tenderness of the either bilateral lower extremities. No midline tenderness of the CTLS spine.    Integument: Warm, Dry, No erythema, No rash. No petechiae.   Neurologic: Alert & oriented x 3, 5/5 strength in all 4 extremities bilaterally. Sensation intact to light touch in all 4 extremities including all dermatomes of the lower extremities bilaterally. No focal deficits noted. Normal gait.     Psychiatric: Affect normal, Judgment normal, Mood normal. Cooperative.      LAB:  All pertinent labs reviewed and interpreted.  Results for orders placed or performed during the hospital encounter of 10/28/24   XR Lumbar Spine 2/3 Views    Impression    IMPRESSION: There are five lumbar-type vertebral bodies. Mild anterior spondylolisthesis at L4-L5 and L5-S1 and mild posterior spondylolisthesis at L2-L3 and  L3-L4. Otherwise normal alignment. Normal vertebral body heights without discrete compression   fracture. Mild-to-moderate disc space narrowing throughout the lower thoracic and upper lumbar spine with bulky ventral osteophyte most notably at L1-L2. Severe facet arthropathy L3 through S1. The visualized sacrum and pelvis are unremarkable.       RADIOLOGY:  Reviewed all pertinent imaging. Please see official radiology report.  XR Lumbar Spine 2/3 Views   Final Result   IMPRESSION: There are five lumbar-type vertebral bodies. Mild anterior spondylolisthesis at L4-L5 and L5-S1 and mild posterior spondylolisthesis at L2-L3 and L3-L4. Otherwise normal alignment. Normal vertebral body heights without discrete compression    fracture. Mild-to-moderate disc space narrowing throughout the lower thoracic and upper lumbar spine with bulky ventral osteophyte most notably at L1-L2. Severe facet arthropathy L3 through S1. The visualized sacrum and pelvis are unremarkable.            Eva Condon MD  [facility] EMERGENCY DEPARTMENT  [address] [place] [id number] [phone]     Eva Condon MD  10/30/24 3025

## 2024-10-29 NOTE — DISCHARGE INSTRUCTIONS
Follow up with your primary care doctor as soon as possible. Continue with physical therapy. Start the medrol dose pack which is the steroids. Can take the robaxin you have at home as a muscle relaxer. Can take tylenol as well.     Return to the ER if you develop severe uncontrolled pain, inability to ambulate, weakness, numbness, tingling, numbness in your groin area, loss of bowel or bladder control, or any new or worsening concerns.

## 2024-10-29 NOTE — ED TRIAGE NOTES
The pt presents for evaluation after slipping while Swiffering her kitchen floor. She twisted her L knee, and landed on her buttock. She is reporting lower back pain. Denies any numbness/pain/tingling going down the legs.

## 2024-12-23 ENCOUNTER — TRANSFERRED RECORDS (OUTPATIENT)
Dept: HEALTH INFORMATION MANAGEMENT | Facility: CLINIC | Age: 64
End: 2024-12-23
Payer: COMMERCIAL

## 2025-01-06 ENCOUNTER — TRANSFERRED RECORDS (OUTPATIENT)
Dept: HEALTH INFORMATION MANAGEMENT | Facility: CLINIC | Age: 65
End: 2025-01-06

## 2025-01-24 ENCOUNTER — TRANSFERRED RECORDS (OUTPATIENT)
Dept: HEALTH INFORMATION MANAGEMENT | Facility: CLINIC | Age: 65
End: 2025-01-24

## 2025-02-21 ENCOUNTER — LAB REQUISITION (OUTPATIENT)
Dept: LAB | Facility: CLINIC | Age: 65
End: 2025-02-21

## 2025-02-21 DIAGNOSIS — N39.0 URINARY TRACT INFECTION, SITE NOT SPECIFIED: ICD-10-CM

## 2025-02-21 PROCEDURE — 87086 URINE CULTURE/COLONY COUNT: CPT | Performed by: FAMILY MEDICINE

## 2025-02-23 LAB — BACTERIA UR CULT: NORMAL

## 2025-02-28 ENCOUNTER — LAB REQUISITION (OUTPATIENT)
Dept: LAB | Facility: CLINIC | Age: 65
End: 2025-02-28

## 2025-02-28 DIAGNOSIS — N39.0 URINARY TRACT INFECTION, SITE NOT SPECIFIED: ICD-10-CM

## 2025-02-28 PROCEDURE — 87086 URINE CULTURE/COLONY COUNT: CPT | Performed by: FAMILY MEDICINE

## 2025-03-02 LAB — BACTERIA UR CULT: NORMAL

## 2025-03-24 ENCOUNTER — TRANSFERRED RECORDS (OUTPATIENT)
Dept: HEALTH INFORMATION MANAGEMENT | Facility: CLINIC | Age: 65
End: 2025-03-24

## 2025-05-12 ENCOUNTER — TRANSFERRED RECORDS (OUTPATIENT)
Dept: HEALTH INFORMATION MANAGEMENT | Facility: CLINIC | Age: 65
End: 2025-05-12

## 2025-05-24 ENCOUNTER — HOSPITAL ENCOUNTER (EMERGENCY)
Facility: HOSPITAL | Age: 65
Discharge: HOME | End: 2025-05-24
Admitting: STUDENT IN AN ORGANIZED HEALTH CARE EDUCATION/TRAINING PROGRAM
Payer: COMMERCIAL

## 2025-05-24 VITALS
TEMPERATURE: 98.6 F | DIASTOLIC BLOOD PRESSURE: 84 MMHG | HEIGHT: 66 IN | OXYGEN SATURATION: 95 % | SYSTOLIC BLOOD PRESSURE: 160 MMHG | WEIGHT: 250 LBS | RESPIRATION RATE: 18 BRPM | BODY MASS INDEX: 40.18 KG/M2 | HEART RATE: 107 BPM

## 2025-05-24 DIAGNOSIS — M54.50 ACUTE EXACERBATION OF CHRONIC LOW BACK PAIN: ICD-10-CM

## 2025-05-24 DIAGNOSIS — G89.29 ACUTE EXACERBATION OF CHRONIC LOW BACK PAIN: ICD-10-CM

## 2025-05-24 PROCEDURE — 96372 THER/PROPH/DIAG INJ SC/IM: CPT | Performed by: STUDENT IN AN ORGANIZED HEALTH CARE EDUCATION/TRAINING PROGRAM

## 2025-05-24 PROCEDURE — 250N000011 HC RX IP 250 OP 636: Mod: JZ | Performed by: STUDENT IN AN ORGANIZED HEALTH CARE EDUCATION/TRAINING PROGRAM

## 2025-05-24 PROCEDURE — 99284 EMERGENCY DEPT VISIT MOD MDM: CPT

## 2025-05-24 PROCEDURE — 250N000013 HC RX MED GY IP 250 OP 250 PS 637: Performed by: STUDENT IN AN ORGANIZED HEALTH CARE EDUCATION/TRAINING PROGRAM

## 2025-05-24 RX ORDER — DIAZEPAM 5 MG/1
5 TABLET ORAL EVERY 6 HOURS PRN
Qty: 5 TABLET | Refills: 0 | Status: SHIPPED | OUTPATIENT
Start: 2025-05-24

## 2025-05-24 RX ORDER — CYCLOBENZAPRINE HCL 10 MG
10 TABLET ORAL ONCE
Status: COMPLETED | OUTPATIENT
Start: 2025-05-24 | End: 2025-05-24

## 2025-05-24 RX ORDER — CYCLOBENZAPRINE HCL 10 MG
10 TABLET ORAL 3 TIMES DAILY PRN
Qty: 15 TABLET | Refills: 0 | Status: SHIPPED | OUTPATIENT
Start: 2025-05-24

## 2025-05-24 RX ORDER — ACETAMINOPHEN 325 MG/1
650 TABLET ORAL ONCE
Status: COMPLETED | OUTPATIENT
Start: 2025-05-24 | End: 2025-05-24

## 2025-05-24 RX ORDER — KETOROLAC TROMETHAMINE 15 MG/ML
15 INJECTION, SOLUTION INTRAMUSCULAR; INTRAVENOUS ONCE
Status: COMPLETED | OUTPATIENT
Start: 2025-05-24 | End: 2025-05-24

## 2025-05-24 RX ORDER — DIAZEPAM 5 MG/1
5 TABLET ORAL ONCE
Status: DISCONTINUED | OUTPATIENT
Start: 2025-05-24 | End: 2025-05-24

## 2025-05-24 RX ADMIN — CYCLOBENZAPRINE 10 MG: 10 TABLET, FILM COATED ORAL at 19:44

## 2025-05-24 RX ADMIN — KETOROLAC TROMETHAMINE 15 MG: 15 INJECTION, SOLUTION INTRAMUSCULAR; INTRAVENOUS at 19:45

## 2025-05-24 RX ADMIN — ACETAMINOPHEN 650 MG: 325 TABLET ORAL at 19:43

## 2025-05-24 ASSESSMENT — ACTIVITIES OF DAILY LIVING (ADL)
ADLS_ACUITY_SCORE: 41

## 2025-05-24 ASSESSMENT — COLUMBIA-SUICIDE SEVERITY RATING SCALE - C-SSRS
2. HAVE YOU ACTUALLY HAD ANY THOUGHTS OF KILLING YOURSELF IN THE PAST MONTH?: NO
1. IN THE PAST MONTH, HAVE YOU WISHED YOU WERE DEAD OR WISHED YOU COULD GO TO SLEEP AND NOT WAKE UP?: NO
6. HAVE YOU EVER DONE ANYTHING, STARTED TO DO ANYTHING, OR PREPARED TO DO ANYTHING TO END YOUR LIFE?: NO

## 2025-05-24 NOTE — ED TRIAGE NOTES
"Pt reports lower left back pain that stared earlier today. Pt states she \"thinks she over did it cleaning outside today.\" Denies numbness or tingling down either leg.      Triage Assessment (Adult)       Row Name 05/24/25 8335          Triage Assessment    Airway WDL WDL        Respiratory WDL    Respiratory WDL WDL        Skin Circulation/Temperature WDL    Skin Circulation/Temperature WDL WDL        Cardiac WDL    Cardiac WDL WDL        Peripheral/Neurovascular WDL    Peripheral Neurovascular WDL WDL        Cognitive/Neuro/Behavioral WDL    Cognitive/Neuro/Behavioral WDL WDL                     "

## 2025-05-24 NOTE — ED PROVIDER NOTES
Emergency Department Encounter   NAME: Jaja Hull ; AGE: 64 year old female ; YOB: 1960 ; MRN: [id number] ; PCP: Christopher Cheng   ED PROVIDER: Mattie Stroud PA-C    Evaluation Date & Time:   No admission date for patient encounter.    CHIEF COMPLAINT:  Back Pain        Impression and Plan   FINAL IMPRESSION:    ICD-10-CM    1. Acute exacerbation of chronic low back pain  M54.50     G89.29           ED Course and Medical Decision Making  Jaja Hull is a 64 year old female with a pertinent history of anxiety, GERD, osteoarthritis, and low back pain with sciatica who presents to the ED for evaluation of left sided low back pain. Pt has hx of chronic low back pain.  She last got a steroid injection early March.  No history of surgery to the back.  Patient states she woke up with some low back pain and took ibuprofen.  She was then washing the siding on the outside of her house and states she likely overdid it. Pt spent several hours today washing the siding of her house, doing a lot of bending forward and wiping motions. Her back gradually grew more and more painful as she continued. She says when she stopped she took two 500mg methocarbamol with no relief. She denies any bowel or bladder incontinence.     Per chart review, patient was evaluated here in the emergency department on 10/28/2024 for bilateral low back pain.  Exam was overall reassuring and patient was given a dose of Toradol and methocarbamol in the ED with good relief.  She was discharged home with Medrol Dosepak and states she improved after a few days. Pt states her symptoms feel quite similar to that episode.  Plan to do a dose of IM Toradol here.  Patient has already taken 1000 mg of Robaxin with no relief so will try dose of flexeril instead.    Vitals reviewed and unremarkable aside from slight tachycardia of 109. Pt appears to be fairly uncomfortable and appears anxious as well. Differential diagnosis/emergent conditions  considered and evaluated for includes but not limited to muscle spasm, sciatica, disc herniation, UTI, kidney stone, vertebral fracture, cauda equina. She does not have any midline spinal tenderness, crepitus, or step-off deformities.  No CVA tenderness.  She does not have any urinary symptoms that have low suspicion for UTI or kidney stone.  No overlying skin changes of the spine.  She is tender to palpation over the left-sided lower lumbar musculature just above the PSIS.  Pain does not radiate down into the left glutes or down the left leg.  She does not have any bowel or bladder incontinence.  No paresthesias in the lower extremities.  Strength 5/5 bilaterally in the lower extremities.  I do not think emergent lumbar MRI is indicated today.  Patient's back pain gradually developed throughout the day as she was cleaning the side of her house for several hours.  There was no trauma to the back, injury, or fall.  I have very low suspicion for vertebral fracture and I do not think CT is indicated.    Patient states her pain is improved from 8/10 to a 4/10 following administration of Toradol and Flexeril.  She got up to walk to the bathroom down the alanis and appeared more upright and more tolerable with ambulation per nursing staff.  Plan to discharge patient home at this point with prescriptions for Flexeril as well as a very small amount of Valium for severe muscle spasm and help with sleeping.  Otherwise, patient should use Tylenol and ibuprofen for pain management as well as over-the-counter pain relievers, heat, and ice.  If patient is not feeling better by next week I recommended she follow-up with her primary care provider for reassessment.  She was educated on red flag low back symptoms that warrant return to the ER and is understanding.  Denies pain.        Medical Decision Making  I considered additional work up, including CT lumbar spine, MR lumbar spine, but deferred given reassuring exam  Discharge. I  prescribed additional prescription strength medication(s) as charted. See documentation for any additional details.    MIPS (CTPE, Dental pain, Vanegas, Sinusitis, Asthma/COPD, Head Trauma): Not Applicable    SEPSIS: None    I considered escalation of care and admitting the patient but ultimately did not given reassuring exam and workup.      ED COURSE:  7:14 PM I met and introduced myself to the patient. I gathered initial history and performed my physical exam. We discussed plan for initial workup.   9:16 PM I rechecked the patient and discussed results, discharge, follow up, and reasons to return to the ED.     At the conclusion of the encounter I discussed the results of all the tests and the disposition. The questions were answered. The patient or family acknowledged understanding and was agreeable with the care plan.        MEDICATIONS GIVEN IN THE EMERGENCY DEPARTMENT:  Medications   ketorolac (TORADOL) injection 15 mg (15 mg Intramuscular $Given 5/24/25 1945)   acetaminophen (TYLENOL) tablet 650 mg (650 mg Oral $Given 5/24/25 1943)   cyclobenzaprine (FLEXERIL) tablet 10 mg (10 mg Oral $Given 5/24/25 1944)         NEW PRESCRIPTIONS STARTED AT TODAY'S ED VISIT:  New Prescriptions    CYCLOBENZAPRINE (FLEXERIL) 10 MG TABLET    Take 1 tablet (10 mg) by mouth 3 times daily as needed for muscle spasms.    DIAZEPAM (VALIUM) 5 MG TABLET    Take 1 tablet (5 mg) by mouth every 6 hours as needed for muscle spasms.         HPI     Jaja Hull is a 64 year old female with a pertinent history of lower back pain with sciatica, osteoarthritis, who presents to the ED by walk-in for evaluation of back pain.    For several hours today, the patient was hand-washing the siding of her house, performing repetitive movements and bending down frequently. She started to gradually feel more left lower back pain and spasming, and took ibuprofen around 11:30 AM. However the pain continued to grow despite breaks, and when she bent down  "and started to clean at one point, she realized how severe the back pain had gotten. She noted that sitting on hard surfaces worsened her pain, and that walking as mildly difficult due to spasming. She took methocarbamol 500 mg at 3:00 PM and when that didn't improve her pain she took another 500 mg at 4:30 PM, but her pain still didn't improve. Thus, she presents to the ER for lower back pain.    She has no radiation of pain into her legs and glutes, and no flank or upper back pain. There is also no numbness, weakness, or tenderness to her legs. She has no history of back surgery, and her most recent lidocaine injection for her back was in Mar-2025. She denies bowel/bladder incontinence as well.     Per Chart Review:  Visit to [facility] ED on 28-Oct-2024 for back pain after a fall. Has a home Robaxin prescription, but had not taken anything prior to arrival. XR back showing chronic back issues with no change from the fall. Improvement with Toradol and Robaxin. Discharged with Medrol Dosepak course.   XR  lumbar spine with full impression as follows: \"There are five lumbar-type vertebral bodies. Mild anterior spondylolisthesis at L4-L5 and L5-S1 and mild posterior spondylolisthesis at L2-L3 and L3-L4. Otherwise normal alignment. Normal vertebral body heights without discrete compression fracture. Mild-to-moderate disc space narrowing throughout the lower thoracic and upper lumbar spine with bulky ventral osteophyte most notably at L1-L2. Severe facet arthropathy L3 through S1. The visualized sacrum and pelvis are unremarkable.\"    Physical Exam     First Vitals:  Patient Vitals for the past 24 hrs:   BP Temp Temp src Pulse Resp SpO2 Height Weight   05/24/25 2015 -- -- -- 107 -- 95 % -- --   05/24/25 2004 (!) 160/84 -- -- 107 -- 94 % -- --   05/24/25 1949 (!) 151/86 -- -- 105 -- 95 % -- --   05/24/25 1934 (!) 146/72 -- -- 106 -- 93 % -- --   05/24/25 1919 (!) 150/77 -- -- 107 -- 95 % -- --   05/24/25 1905 (!) 168/77 " "-- -- 109 -- 94 % -- --   05/24/25 1856 (!) 158/101 97.8  F (36.6  C) Oral 115 18 94 % 1.676 m (5' 6\") 113.4 kg (250 lb)         PHYSICAL EXAM    General Appearance:  Alert, cooperative, no distress  Respiratory: No distress.   Cardiovascular: Regular rate   GI: Abdomen soft, nontender  : No CVA tenderness  Musculoskeletal: Moving all extremities. No gross deformities. No overlying skin changes of the spine.  She is tender to palpation over the left-sided lower lumbar musculature just above the PSIS.  Pain does not radiate down into the left glutes or down the left leg.  She does not have any bowel or bladder incontinence.  No paresthesias in the lower extremities.  Strength 5/5 bilaterally in the lower extremities.   Integument: Warm, dry, no rashes or lesions  Neurologic: Alert         Results     LAB:  All pertinent labs reviewed and interpreted  Labs Ordered and Resulted from Time of ED Arrival to Time of ED Departure - No data to display    RADIOLOGY:  No orders to display         ECG:  N/A      PROCEDURES:  N/A      Lm CARRINGTON, am serving as a scribe to document services personally performed by Mattie Stroud PA-C, based on my observation and the provider's statements to me. IMattie PA-C attest that Lm Sexton is acting in a scribe capacity, has observed my performance of the services and has documented them in accordance with my direction.       Mattie Stroud PA-C   Emergency Medicine   [facility] EMERGENCY DEPARTMENT       Mattie Stroud PA-C  05/25/25 0118    "

## 2025-05-25 NOTE — DISCHARGE INSTRUCTIONS
You were seen in the ER [facility] for low back pain  Your exam is reassuring, no neurologic deficits. I suspect you overdid it today and are experiencing muscle spasm.    I recommend applying a heating pad or ice pack on the back for 10-15 minutes a few times per day. You can also try any topical pain relievers such as IcyHot.  You may take Ibuprofen up to 400 mg by mouth every 4-6 hours as needed for pain. Do not exceed 2400 mg/day.  You may take Tylenol 325-1000 mg by mouth every 4-6 hours as needed for pain. Do not exceed 1000mg (1g) in 4 hours or 4 g/day from all sources.  You may combine Tylenol and Ibuprofen- up to 400 mg of ibuprofen and 1000 mg of Tylenol at the same time, up to 3 times a day for the pain  You were given prescriptions for muscle relaxers flexeril and valium  I recommend trying flexeril first to see if this helps your pain, you can take this up to 3 times per day.  If you are having very severe muscle spasms that are making it very difficult for you to walk or do activities you can take a dose of Valium.  This is a stronger muscle relaxer and can also be very sedating.  You should not drive or operate heavy machinery while taking any muscle relaxers.    If you are not improving as expected I recommend following up with your primary care provider next week.  If you develop any new bowel or bladder incontinence or new numbness or tingling in the legs these would be reasons to return to the emergency department.

## 2025-07-09 ENCOUNTER — TRANSFERRED RECORDS (OUTPATIENT)
Dept: HEALTH INFORMATION MANAGEMENT | Facility: CLINIC | Age: 65
End: 2025-07-09
Payer: COMMERCIAL

## 2025-07-23 ENCOUNTER — TRANSFERRED RECORDS (OUTPATIENT)
Dept: HEALTH INFORMATION MANAGEMENT | Facility: CLINIC | Age: 65
End: 2025-07-23
Payer: COMMERCIAL